# Patient Record
Sex: FEMALE | URBAN - METROPOLITAN AREA
[De-identification: names, ages, dates, MRNs, and addresses within clinical notes are randomized per-mention and may not be internally consistent; named-entity substitution may affect disease eponyms.]

---

## 2017-06-16 ENCOUNTER — APPOINTMENT (OUTPATIENT)
Age: 37
Setting detail: DERMATOLOGY
End: 2017-06-16

## 2017-06-16 DIAGNOSIS — L57.8 OTHER SKIN CHANGES DUE TO CHRONIC EXPOSURE TO NONIONIZING RADIATION: ICD-10-CM

## 2017-06-16 DIAGNOSIS — D18.0 HEMANGIOMA: ICD-10-CM

## 2017-06-16 DIAGNOSIS — L72.8 OTHER FOLLICULAR CYSTS OF THE SKIN AND SUBCUTANEOUS TISSUE: ICD-10-CM

## 2017-06-16 PROBLEM — D18.01 HEMANGIOMA OF SKIN AND SUBCUTANEOUS TISSUE: Status: ACTIVE | Noted: 2017-06-16

## 2017-06-16 PROCEDURE — 99214 OFFICE O/P EST MOD 30 MIN: CPT

## 2017-06-16 PROCEDURE — OTHER TREATMENT REGIMEN: OTHER

## 2017-06-16 PROCEDURE — OTHER REASSURANCE: OTHER

## 2017-06-16 PROCEDURE — OTHER COUNSELING: OTHER

## 2017-06-16 ASSESSMENT — LOCATION ZONE DERM
LOCATION ZONE: LEG
LOCATION ZONE: TRUNK

## 2017-06-16 ASSESSMENT — LOCATION DETAILED DESCRIPTION DERM
LOCATION DETAILED: LOWER STERNUM
LOCATION DETAILED: LEFT PROXIMAL POSTERIOR THIGH
LOCATION DETAILED: SUPERIOR THORACIC SPINE

## 2017-06-16 ASSESSMENT — LOCATION SIMPLE DESCRIPTION DERM
LOCATION SIMPLE: CHEST
LOCATION SIMPLE: UPPER BACK
LOCATION SIMPLE: LEFT POSTERIOR THIGH

## 2017-06-16 NOTE — PROCEDURE: TREATMENT REGIMEN
Detail Level: Detailed
Plan: No treatment needed, excision at any point in the future. Follow up if any change in status: any inflammation or pain

## 2017-06-16 NOTE — HPI: EVALUATION OF SKIN LESION(S)
How Severe Are Your Spot(S)?: mild
Have Your Spot(S) Been Treated In The Past?: has not been treated
Hpi Title: Evaluation of Skin Lesions
Additional History: Patient has a darker spot on the back of the left upper leg that she just recently noticed. It is asymptomatic, no pain, no bleeding. But she wanted to make sure it was OK. She also would just like a good overall skin check as she has spent time in the sun in the past, and has gone to tanning beds when she was younger. Currently she is very good about sunscreen use and sun protection. She has no previous history of skin cancer. Regarding the cyst on the mid chest it is completely back to normal with no pain or inflammation

## 2017-06-16 NOTE — PROCEDURE: REASSURANCE
Detail Level: Detailed
Additional Note: Normal full body skin exam, no evidence of pre-cancers or skin cancers. Follow up annually
Additional Note: Patient reassured this is benign, no treatment needed. Follow up if any change in size
Detail Level: Generalized
Include Location In Plan?: Yes